# Patient Record
Sex: MALE | Race: WHITE | NOT HISPANIC OR LATINO | Employment: OTHER | URBAN - NONMETROPOLITAN AREA
[De-identification: names, ages, dates, MRNs, and addresses within clinical notes are randomized per-mention and may not be internally consistent; named-entity substitution may affect disease eponyms.]

---

## 2018-02-15 ENCOUNTER — DOCUMENTATION ONLY (OUTPATIENT)
Dept: FAMILY MEDICINE | Facility: OTHER | Age: 48
End: 2018-02-15

## 2018-02-15 PROBLEM — Z98.52 STATUS POST VASECTOMY: Status: ACTIVE | Noted: 2018-02-15

## 2018-02-15 RX ORDER — SULFASALAZINE 500 MG/1
1000 TABLET ORAL 2 TIMES DAILY
COMMUNITY
Start: 2016-09-29 | End: 2018-03-05

## 2018-02-15 RX ORDER — ALBUTEROL SULFATE 90 UG/1
2 AEROSOL, METERED RESPIRATORY (INHALATION) 4 TIMES DAILY PRN
COMMUNITY
Start: 2016-09-29 | End: 2019-02-05

## 2018-03-05 DIAGNOSIS — K51.919 ULCERATIVE COLITIS WITH COMPLICATION, UNSPECIFIED LOCATION (H): Primary | ICD-10-CM

## 2018-03-05 NOTE — TELEPHONE ENCOUNTER
Chart review shows that patient was last seen by PCP on 9/29/16 for diagnosis of ulcerative colitis as well as to discuss continued use of rx as requested. At that time, patient was relocating and PCP recommended annual labwork. No labwork has been completed, and no office visit is noted. Call placed to patient to discuss. Patient reports that he did indeed relocate, but is now back in town for awhile. Is willing to see PCP in the office to discuss labwork that is needed, as well as to discuss his overall health. Writer advised patient that he would hien up rx as requested for a limited supply at this time, and request his PCP's approval. Patient was transferred to scheduling staff for an appointment.    Unable to complete prescription refill per RN Medication Refill Policy. Zhang Maldonado 3/5/2018 4:18 PM

## 2018-03-06 RX ORDER — SULFASALAZINE 500 MG/1
1000 TABLET ORAL 2 TIMES DAILY
Qty: 360 TABLET | Refills: 1 | Status: SHIPPED | OUTPATIENT
Start: 2018-03-06 | End: 2019-02-05

## 2019-02-05 ENCOUNTER — TELEPHONE (OUTPATIENT)
Dept: SURGERY | Facility: OTHER | Age: 49
End: 2019-02-05

## 2019-02-05 ENCOUNTER — OFFICE VISIT (OUTPATIENT)
Dept: FAMILY MEDICINE | Facility: OTHER | Age: 49
End: 2019-02-05
Attending: FAMILY MEDICINE
Payer: COMMERCIAL

## 2019-02-05 VITALS
WEIGHT: 205 LBS | HEIGHT: 71 IN | DIASTOLIC BLOOD PRESSURE: 70 MMHG | SYSTOLIC BLOOD PRESSURE: 104 MMHG | HEART RATE: 78 BPM | BODY MASS INDEX: 28.7 KG/M2 | TEMPERATURE: 97.1 F

## 2019-02-05 DIAGNOSIS — L98.9 SKIN LESION: Primary | ICD-10-CM

## 2019-02-05 DIAGNOSIS — K51.919 ULCERATIVE COLITIS WITH COMPLICATION, UNSPECIFIED LOCATION (H): ICD-10-CM

## 2019-02-05 LAB
ALBUMIN SERPL-MCNC: 4.5 G/DL (ref 3.5–5.7)
ALP SERPL-CCNC: 62 U/L (ref 34–104)
ALT SERPL W P-5'-P-CCNC: 23 U/L (ref 7–52)
ANION GAP SERPL CALCULATED.3IONS-SCNC: 8 MMOL/L (ref 3–14)
AST SERPL W P-5'-P-CCNC: 22 U/L (ref 13–39)
BASOPHILS # BLD AUTO: 0 10E9/L (ref 0–0.2)
BASOPHILS NFR BLD AUTO: 0 %
BILIRUB SERPL-MCNC: 0.5 MG/DL (ref 0.3–1)
BUN SERPL-MCNC: 16 MG/DL (ref 7–25)
CALCIUM SERPL-MCNC: 9.6 MG/DL (ref 8.6–10.3)
CHLORIDE SERPL-SCNC: 104 MMOL/L (ref 98–107)
CO2 SERPL-SCNC: 27 MMOL/L (ref 21–31)
CREAT SERPL-MCNC: 0.97 MG/DL (ref 0.7–1.3)
DIFFERENTIAL METHOD BLD: ABNORMAL
EOSINOPHIL # BLD AUTO: 0 10E9/L (ref 0–0.7)
EOSINOPHIL NFR BLD AUTO: 0.2 %
ERYTHROCYTE [DISTWIDTH] IN BLOOD BY AUTOMATED COUNT: 12.6 % (ref 10–15)
ERYTHROCYTE [SEDIMENTATION RATE] IN BLOOD BY WESTERGREN METHOD: 12 MM/H (ref 1–10)
GFR SERPL CREATININE-BSD FRML MDRD: 83 ML/MIN/{1.73_M2}
GLUCOSE SERPL-MCNC: 93 MG/DL (ref 70–105)
HCT VFR BLD AUTO: 41.7 % (ref 40–53)
HGB BLD-MCNC: 14.4 G/DL (ref 13.3–17.7)
IMM GRANULOCYTES # BLD: 0 10E9/L (ref 0–0.4)
IMM GRANULOCYTES NFR BLD: 0.2 %
LYMPHOCYTES # BLD AUTO: 1.4 10E9/L (ref 0.8–5.3)
LYMPHOCYTES NFR BLD AUTO: 29.8 %
MCH RBC QN AUTO: 33.2 PG (ref 26.5–33)
MCHC RBC AUTO-ENTMCNC: 34.5 G/DL (ref 31.5–36.5)
MCV RBC AUTO: 96 FL (ref 78–100)
MONOCYTES # BLD AUTO: 0.5 10E9/L (ref 0–1.3)
MONOCYTES NFR BLD AUTO: 11.7 %
NEUTROPHILS # BLD AUTO: 2.7 10E9/L (ref 1.6–8.3)
NEUTROPHILS NFR BLD AUTO: 58.1 %
PLATELET # BLD AUTO: 154 10E9/L (ref 150–450)
POTASSIUM SERPL-SCNC: 4.1 MMOL/L (ref 3.5–5.1)
PROT SERPL-MCNC: 7.1 G/DL (ref 6.4–8.9)
RBC # BLD AUTO: 4.34 10E12/L (ref 4.4–5.9)
SODIUM SERPL-SCNC: 139 MMOL/L (ref 134–144)
WBC # BLD AUTO: 4.6 10E9/L (ref 4–11)

## 2019-02-05 PROCEDURE — 99214 OFFICE O/P EST MOD 30 MIN: CPT | Mod: 25 | Performed by: FAMILY MEDICINE

## 2019-02-05 PROCEDURE — 80053 COMPREHEN METABOLIC PANEL: CPT | Performed by: FAMILY MEDICINE

## 2019-02-05 PROCEDURE — 11106 INCAL BX SKN SINGLE LES: CPT | Performed by: FAMILY MEDICINE

## 2019-02-05 PROCEDURE — 85025 COMPLETE CBC W/AUTO DIFF WBC: CPT | Performed by: FAMILY MEDICINE

## 2019-02-05 PROCEDURE — 36415 COLL VENOUS BLD VENIPUNCTURE: CPT | Performed by: FAMILY MEDICINE

## 2019-02-05 PROCEDURE — 88305 TISSUE EXAM BY PATHOLOGIST: CPT

## 2019-02-05 PROCEDURE — 85652 RBC SED RATE AUTOMATED: CPT | Performed by: FAMILY MEDICINE

## 2019-02-05 RX ORDER — SULFASALAZINE 500 MG/1
1000 TABLET ORAL 2 TIMES DAILY
Qty: 360 TABLET | Refills: 3 | Status: SHIPPED | OUTPATIENT
Start: 2019-02-05

## 2019-02-05 ASSESSMENT — MIFFLIN-ST. JEOR: SCORE: 1832

## 2019-02-05 ASSESSMENT — PAIN SCALES - GENERAL: PAINLEVEL: NO PAIN (0)

## 2019-02-05 NOTE — NURSING NOTE
Patient is here for annual physical and medication renewal. Patient also has area on head he would like checked.  Neeta Calloway LPN .............2/5/2019     10:25 AM        Medication Reconciliation: complete    Neeta Calloway LPN  2/5/2019 10:30 AM

## 2019-02-05 NOTE — TELEPHONE ENCOUNTER
Patient referred by Dr. Vieyra for a diagnostic colonoscopy .  Diagnosis is ulcerative colitis with complication.  Last colonoscopy was 09/21/2015.  Please advise.  Thank you. Carmen Howard on 2/5/2019 at 2:03 PM

## 2019-02-05 NOTE — PATIENT INSTRUCTIONS
Continue current medications  Need annual lab draw  Given immunosuppression, skin type (fair), you are at higher risk for skin cancer  May want to consider dermatology consult at some point  Will call with results of biopsy    Need to have colonoscopy 3-5 years, due in 9/2020

## 2019-02-11 ASSESSMENT — ENCOUNTER SYMPTOMS
DIAPHORESIS: 0
ABDOMINAL PAIN: 0
CHOKING: 0
DIARRHEA: 0
FATIGUE: 0
CHILLS: 0
CONSTIPATION: 0
BACK PAIN: 0
DIZZINESS: 0
APPETITE CHANGE: 0
ABDOMINAL DISTENTION: 0
HEMATOCHEZIA: 0
ACTIVITY CHANGE: 0
FEVER: 0
CHEST TIGHTNESS: 0
ANAL BLEEDING: 0

## 2019-03-21 ENCOUNTER — HOSPITAL ENCOUNTER (EMERGENCY)
Facility: OTHER | Age: 49
Discharge: HOME | End: 2019-03-21
Attending: EMERGENCY MEDICINE | Admitting: EMERGENCY MEDICINE
Payer: COMMERCIAL

## 2019-03-21 VITALS
DIASTOLIC BLOOD PRESSURE: 85 MMHG | SYSTOLIC BLOOD PRESSURE: 136 MMHG | BODY MASS INDEX: 28.63 KG/M2 | TEMPERATURE: 98.1 F | WEIGHT: 200 LBS | HEIGHT: 70 IN | OXYGEN SATURATION: 96 % | RESPIRATION RATE: 16 BRPM | HEART RATE: 92 BPM

## 2019-03-21 DIAGNOSIS — L03.116 CELLULITIS OF LEFT LOWER EXTREMITY: ICD-10-CM

## 2019-03-21 PROCEDURE — 25000132 ZZH RX MED GY IP 250 OP 250 PS 637: Performed by: EMERGENCY MEDICINE

## 2019-03-21 PROCEDURE — 99283 EMERGENCY DEPT VISIT LOW MDM: CPT | Mod: Z6 | Performed by: EMERGENCY MEDICINE

## 2019-03-21 PROCEDURE — 99283 EMERGENCY DEPT VISIT LOW MDM: CPT | Performed by: EMERGENCY MEDICINE

## 2019-03-21 RX ORDER — SULFAMETHOXAZOLE/TRIMETHOPRIM 800-160 MG
1 TABLET ORAL 2 TIMES DAILY
Qty: 14 TABLET | Refills: 0 | Status: SHIPPED | OUTPATIENT
Start: 2019-03-21 | End: 2019-03-28

## 2019-03-21 RX ORDER — SULFAMETHOXAZOLE/TRIMETHOPRIM 800-160 MG
1 TABLET ORAL ONCE
Status: COMPLETED | OUTPATIENT
Start: 2019-03-21 | End: 2019-03-21

## 2019-03-21 RX ADMIN — SULFAMETHOXAZOLE AND TRIMETHOPRIM 1 TABLET: 800; 160 TABLET ORAL at 23:18

## 2019-03-21 ASSESSMENT — MIFFLIN-ST. JEOR: SCORE: 1793.44

## 2019-03-21 NOTE — ED AVS SNAPSHOT
[facility]  [address]  [place] [address]  Phone:  [phone]  Fax:  [phone]                                    Lazaro Fernandez   MRN: [id number]    Department:  [facility]   Date of Visit:  3/21/2019           After Visit Summary Signature Page    I have received my discharge instructions, and my questions have been answered. I have discussed any challenges I see with this plan with the nurse or doctor.    ..........................................................................................................................................  Patient/Patient Representative Signature      ..........................................................................................................................................  Patient Representative Print Name and Relationship to Patient    ..................................................               ................................................  Date                                   Time    ..........................................................................................................................................  Reviewed by Signature/Title    ...................................................              ..............................................  Date                                               Time          22EPIC Rev 08/18

## 2019-03-22 NOTE — ED TRIAGE NOTES
Pt here with family with c/o possible infection to left leg, pt states that he noted a sore to lower leg 2 days ago and a fever the past couple of evenings, pt states that tonight he noted redness all the way up his leg, VSS, pt brought back into ER to be evaluated

## 2019-03-22 NOTE — ED PROVIDER NOTES
"Lazaro Fernandez  : 1970 Age: 48 year old Sex: male MRN: [id number]    CC: Leg infection    HPI: Lazaro Fernandez is a 48 year old male with PMH significant for ulcerative colitis on sulfasalazine who presents with an infection of his left leg.  He states that he noted a sore on his left medial calf about 2 days ago and has had subjective fevers with some malaise over the past couple of days.  This evening as he was getting to bed he was not feeling well and then he noticed that the redness surrounding the sore had spread and align up his leg and decided to come in to get evaluated.  He denies any lightheadedness, chest pain, minimal pain to the extremity.    ED Course and MDM:  Leg infection: Overall on exam there is no appreciable abscess, there are no signs or symptoms concerning for necrotizing soft tissue infection.  This is consistent with cellulitis with some lymphatic spread of the leg.  Given the patient's overall appearance and wellness, we discussed outpatient management with antibiotics and he was amenable to this.  He will to be discharged home with prescription for Bactrim and plans for follow-up with primary care    Final Clinical Impression:  Cellulitis    Mike Hanks MD  Internal Medicine and Emergency Medicine  11:11 PM 19      Physical Exam:  /85   Pulse 92   Temp 98.1  F (36.7  C) (Temporal)   Resp 16   Ht 1.778 m (5' 10\")   Wt 90.7 kg (200 lb)   SpO2 96%   BMI 28.70 kg/m      Gen: Awake, alert, no apparent distress  CV: RRR, WWP  Ext: Left lower extremity with 5 x 5 cm area of the erythema with a darker healing sore in the middle with no palpable induration or fluctuance.  Extending proximally up the leg from this there is an erythematous line  that appears to follow the lymphatics.  There is minimal tenderness to palpation of the calf, no crepitus    ROS:  Focused ROS performed and noted in HPI             Mike Hanks MD  19 " [id number]

## 2019-11-06 DIAGNOSIS — K51.919 ULCERATIVE COLITIS WITH COMPLICATION, UNSPECIFIED LOCATION (H): ICD-10-CM

## 2019-11-11 RX ORDER — SULFASALAZINE 500 MG/1
1000 TABLET ORAL 2 TIMES DAILY
Qty: 360 TABLET | Refills: 3 | OUTPATIENT
Start: 2019-11-11

## 2019-11-11 NOTE — TELEPHONE ENCOUNTER
Redundant refill request refused: Too soon:    sulfaSALAzine (AZULFIDINE) 500 MG tablet 360 tablet 3 2/5/2019  No   Sig - Route: Take 2 tablets (1,000 mg) by mouth 2 times daily - Oral   Patient taking differently: Take 500 mg by mouth 2 times daily         Sent to pharmacy as: sulfaSALAzine (AZULFIDINE) 500 MG tablet   Class: E-Prescribe   Order: [id number]   E-Prescribing Status: Receipt confirmed by pharmacy (2/5/2019 10:44 AM CST)   Printout Tracking     External Result Report   Pharmacy     [id number] - [place], [place] - [address]     Unable to complete prescription refill per RN Medication Refill Policy. Elis Liz RN .............. 11/11/2019  8:58 AM

## 2019-11-22 ENCOUNTER — MYC REFILL (OUTPATIENT)
Dept: FAMILY MEDICINE | Facility: OTHER | Age: 49
End: 2019-11-22

## 2019-11-22 DIAGNOSIS — K51.919 ULCERATIVE COLITIS WITH COMPLICATION, UNSPECIFIED LOCATION (H): ICD-10-CM

## 2019-11-26 RX ORDER — SULFASALAZINE 500 MG/1
1000 TABLET ORAL 2 TIMES DAILY
Qty: 360 TABLET | Refills: 3 | OUTPATIENT
Start: 2019-11-26

## 2019-11-26 NOTE — TELEPHONE ENCOUNTER
Disp Refills Start End JESSICA   sulfaSALAzine (AZULFIDINE) 500 MG tablet 360 tablet 3 2/5/2019  No   Sig - Route: Take 2 tablets (1,000 mg) by mouth 2 times daily - Oral   Patient taking differently: Take 500 mg by mouth 2 times daily             LOV: 2/5/2019    Called [facility]. Script was transferred to [facility]. I called [facility] they have script on hand. I then called patient and let him know and asked if he wanted it at [facility] or wanted it transferred to [facility]. Patient reported no that's fine he will just picked it up at one of the [facility] in [place].  Denying refill request at this time. Clarita Martinez RN  ....................  11/26/2019   2:04 PM      Requested Prescriptions   Pending Prescriptions Disp Refills     sulfaSALAzine (AZULFIDINE) 500 MG tablet 360 tablet 3     Sig: Take 2 tablets (1,000 mg) by mouth 2 times daily       There is no refill protocol information for this order

## 2019-12-12 ENCOUNTER — OFFICE VISIT (OUTPATIENT)
Dept: FAMILY MEDICINE | Facility: OTHER | Age: 49
End: 2019-12-12
Attending: FAMILY MEDICINE
Payer: COMMERCIAL

## 2019-12-12 ENCOUNTER — MYC MEDICAL ADVICE (OUTPATIENT)
Dept: FAMILY MEDICINE | Facility: OTHER | Age: 49
End: 2019-12-12

## 2019-12-12 VITALS
WEIGHT: 199.25 LBS | DIASTOLIC BLOOD PRESSURE: 72 MMHG | BODY MASS INDEX: 27.9 KG/M2 | HEART RATE: 80 BPM | HEIGHT: 71 IN | SYSTOLIC BLOOD PRESSURE: 116 MMHG | TEMPERATURE: 96.7 F | RESPIRATION RATE: 18 BRPM

## 2019-12-12 DIAGNOSIS — K51.811 OTHER ULCERATIVE COLITIS WITH RECTAL BLEEDING (H): Primary | ICD-10-CM

## 2019-12-12 DIAGNOSIS — Z12.83 SKIN EXAM FOR MALIGNANT NEOPLASM: ICD-10-CM

## 2019-12-12 DIAGNOSIS — L57.0 AK (ACTINIC KERATOSIS): ICD-10-CM

## 2019-12-12 LAB
BASOPHILS # BLD AUTO: 0 10E9/L (ref 0–0.2)
BASOPHILS NFR BLD AUTO: 0 %
DIFFERENTIAL METHOD BLD: ABNORMAL
EOSINOPHIL # BLD AUTO: 0 10E9/L (ref 0–0.7)
EOSINOPHIL NFR BLD AUTO: 0 %
ERYTHROCYTE [DISTWIDTH] IN BLOOD BY AUTOMATED COUNT: 13.6 % (ref 10–15)
ERYTHROCYTE [SEDIMENTATION RATE] IN BLOOD BY WESTERGREN METHOD: 22 MM/H (ref 1–10)
HCT VFR BLD AUTO: 40.3 % (ref 40–53)
HGB BLD-MCNC: 12.9 G/DL (ref 13.3–17.7)
IMM GRANULOCYTES # BLD: 0 10E9/L (ref 0–0.4)
IMM GRANULOCYTES NFR BLD: 0.2 %
LYMPHOCYTES # BLD AUTO: 1.4 10E9/L (ref 0.8–5.3)
LYMPHOCYTES NFR BLD AUTO: 28.6 %
MCH RBC QN AUTO: 30.9 PG (ref 26.5–33)
MCHC RBC AUTO-ENTMCNC: 32 G/DL (ref 31.5–36.5)
MCV RBC AUTO: 96 FL (ref 78–100)
MONOCYTES # BLD AUTO: 0.7 10E9/L (ref 0–1.3)
MONOCYTES NFR BLD AUTO: 14.1 %
NEUTROPHILS # BLD AUTO: 2.8 10E9/L (ref 1.6–8.3)
NEUTROPHILS NFR BLD AUTO: 57.1 %
PLATELET # BLD AUTO: 171 10E9/L (ref 150–450)
RBC # BLD AUTO: 4.18 10E12/L (ref 4.4–5.9)
WBC # BLD AUTO: 4.9 10E9/L (ref 4–11)

## 2019-12-12 PROCEDURE — 36415 COLL VENOUS BLD VENIPUNCTURE: CPT | Mod: ZL | Performed by: FAMILY MEDICINE

## 2019-12-12 PROCEDURE — 85025 COMPLETE CBC W/AUTO DIFF WBC: CPT | Mod: ZL | Performed by: FAMILY MEDICINE

## 2019-12-12 PROCEDURE — 99214 OFFICE O/P EST MOD 30 MIN: CPT | Performed by: FAMILY MEDICINE

## 2019-12-12 PROCEDURE — 85652 RBC SED RATE AUTOMATED: CPT | Mod: ZL | Performed by: FAMILY MEDICINE

## 2019-12-12 RX ORDER — BUDESONIDE 9 MG/1
9 TABLET, FILM COATED, EXTENDED RELEASE ORAL EVERY MORNING
Qty: 56 TABLET | Refills: 0 | Status: SHIPPED | OUTPATIENT
Start: 2019-12-12 | End: 2019-12-17

## 2019-12-12 ASSESSMENT — ENCOUNTER SYMPTOMS
CONSTIPATION: 0
ANAL BLEEDING: 1
FEVER: 0
FATIGUE: 0
DIARRHEA: 0
HEARTBURN: 0
ABDOMINAL PAIN: 0
RECTAL PAIN: 0
NAUSEA: 0
UNEXPECTED WEIGHT CHANGE: 0
VOMITING: 0
HEMATOCHEZIA: 1
HEADACHES: 0

## 2019-12-12 ASSESSMENT — MIFFLIN-ST. JEOR: SCORE: 1800.92

## 2019-12-12 ASSESSMENT — PAIN SCALES - GENERAL: PAINLEVEL: NO PAIN (0)

## 2019-12-12 NOTE — PROGRESS NOTES
SUBJECTIVE:   Lazaro Fernandez is a 49 year old male who presents to clinic today for the following health issues:  Nursing Notes:   Elis Kendall LPN  12/12/2019 11:29 AM  Signed  Patient presents to clinic for follow up with ulcerative colitis.  Also, he is experiencing repeat derm bumps along hairline of head.    Medication Reconciliation: complete    Elis Kendall LPN      HPI     Pleasant 49-year-old male with ulcerative colitis presents with bright red blood in his stool.  Symptoms have been present for the past 3 months.  Reports bloody discharge at the end of his stool.  Ulcerative colitis was diagnosed in 2005 at [place].  He was followed by gastroenterology for many years with his last colonoscopy being in 2015.  He had very mild disease and was told he can go to colonoscopies every 5 years.  Last UC flare was over 10 years ago.    He denies change in weight or appetite.    He decided to double sulfsalazine with no change in symptoms    He admits to being under fair amount of stress.  His father was diagnosed with leukemia and has been in the hospital last few months.  They have been traveling to [place] to visit him.        Patient Active Problem List    Diagnosis Date Noted     Status post vasectomy 02/15/2018     Priority: Medium     Idiopathic urticaria 06/29/2012     Priority: Medium     Ganglion of joint 12/02/2011     Priority: Medium     Ulcerative colitis (H) 04/01/2002     Priority: Medium     Past Medical History:   Diagnosis Date     Ulcerative colitis without complications (H)     mild. Last colonoscopy 2008. Dr Szymanski ([facility])      Current Outpatient Medications   Medication Sig Dispense Refill     budesonide (UCERIS) 9 MG 24 hr tablet Take 1 tablet (9 mg) by mouth every morning 56 tablet 0     sulfaSALAzine (AZULFIDINE) 500 MG tablet Take 2 tablets (1,000 mg) by mouth 2 times daily (Patient taking differently: Take 500 mg by mouth 2 times daily ) 360 tablet 3  "      Review of Systems   Constitutional: Negative for fatigue, fever and unexpected weight change.   Gastrointestinal: Positive for anal bleeding and hematochezia. Negative for abdominal pain, constipation, diarrhea, heartburn, nausea, rectal pain and vomiting.   Skin: Negative for rash.   Allergic/Immunologic: Negative for food allergies.   Neurological: Negative for headaches.        OBJECTIVE:     /72 (BP Location: Right arm, Patient Position: Sitting, Cuff Size: Adult Large)   Pulse 80   Temp 96.7  F (35.9  C) (Tympanic)   Resp 18   Ht 1.803 m (5' 11\")   Wt 90.4 kg (199 lb 4 oz)   BMI 27.79 kg/m    Body mass index is 27.79 kg/m .  Physical Exam  HENT:      Head: Normocephalic.   Cardiovascular:      Rate and Rhythm: Normal rate.      Heart sounds: No murmur.   Pulmonary:      Effort: Pulmonary effort is normal. No respiratory distress.      Breath sounds: No wheezing.   Abdominal:      General: Abdomen is flat. Bowel sounds are normal. There is no distension.      Palpations: Abdomen is soft. There is no mass.      Tenderness: There is no abdominal tenderness. There is no guarding.      Hernia: No hernia is present.   Skin:     Coloration: Skin is not jaundiced.      Findings: No rash.   Neurological:      Mental Status: He is alert.   Psychiatric:         Mood and Affect: Mood normal.         Diagnostic Test Results:  Results for orders placed or performed in visit on 12/12/19 (from the past 24 hour(s))   Sedimentation Rate (ESR)   Result Value Ref Range    Sed Rate 22 (H) 1 - 10 mm/h   CBC with platelets differential   Result Value Ref Range    WBC 4.9 4.0 - 11.0 10e9/L    RBC Count 4.18 (L) 4.4 - 5.9 10e12/L    Hemoglobin 12.9 (L) 13.3 - 17.7 g/dL    Hematocrit 40.3 40.0 - 53.0 %    MCV 96 78 - 100 fl    MCH 30.9 26.5 - 33.0 pg    MCHC 32.0 31.5 - 36.5 g/dL    RDW 13.6 10.0 - 15.0 %    Platelet Count 171 150 - 450 10e9/L    Diff Method Automated Method     % Neutrophils 57.1 %    % Lymphocytes " 28.6 %    % Monocytes 14.1 %    % Eosinophils 0.0 %    % Basophils 0.0 %    % Immature Granulocytes 0.2 %    Absolute Neutrophil 2.8 1.6 - 8.3 10e9/L    Absolute Lymphocytes 1.4 0.8 - 5.3 10e9/L    Absolute Monocytes 0.7 0.0 - 1.3 10e9/L    Absolute Eosinophils 0.0 0.0 - 0.7 10e9/L    Absolute Basophils 0.0 0.0 - 0.2 10e9/L    Abs Immature Granulocytes 0.0 0 - 0.4 10e9/L       ASSESSMENT/PLAN:           ICD-10-CM    1. Other ulcerative colitis with rectal bleeding (H) K51.811 CBC with platelets differential     Sedimentation Rate (ESR)     GASTROENTEROLOGY ADULT REF PROCEDURE ONLY [facility] [phone]     Sedimentation Rate (ESR)     CBC with platelets differential     budesonide (UCERIS) 9 MG 24 hr tablet   2. Skin exam for malignant neoplasm Z12.83 DERMATOLOGY REFERRAL   3. AK (actinic keratosis) L57.0 DERMATOLOGY REFERRAL       Hemoglobin is down to 12.9 with slight elevation in sed rate consistent with colitis flare.  Will treat with budesonide 9 mg daily for 8 weeks.  Discussed with Dr. Matias and will get him set up in the next few weeks for diagnostic colonoscopy as he would be due in early 2020.     referred to dermatology for skin exam  Patient Instructions   Labs today  Schedule c-scope in the next few weeks  May consider prednisone burst if sed rate is high  Will call later today with lab results    I spent over 25 minutes with the patient, greater than 50% was spent in counseling and coordination of care.  [facility]

## 2019-12-12 NOTE — PATIENT INSTRUCTIONS
Labs today  Schedule c-scope in the next few weeks  May consider prednisone burst if sed rate is high  Will call later today with lab results

## 2019-12-12 NOTE — NURSING NOTE
Patient presents to clinic for follow up with ulcerative colitis.  Also, he is experiencing repeat derm bumps along hairline of head.    Medication Reconciliation: complete    Elis Kendall LPN

## 2019-12-13 ENCOUNTER — TELEPHONE (OUTPATIENT)
Dept: SURGERY | Facility: OTHER | Age: 49
End: 2019-12-13

## 2019-12-13 ENCOUNTER — MYC MEDICAL ADVICE (OUTPATIENT)
Dept: FAMILY MEDICINE | Facility: OTHER | Age: 49
End: 2019-12-13

## 2019-12-13 DIAGNOSIS — K51.811 OTHER ULCERATIVE COLITIS WITH RECTAL BLEEDING (H): Primary | ICD-10-CM

## 2019-12-13 NOTE — TELEPHONE ENCOUNTER
Patient referred by Dr. Vieyra for a diagnostic colonoscopy ,   Diagnosis is ulcerative colitis with rectal bleeding .  Please advise if ok to schedule .  Thank you. Carmen Howard on 12/13/2019 at 9:05 AM

## 2019-12-17 RX ORDER — BUDESONIDE 3 MG/1
9 CAPSULE, COATED PELLETS ORAL EVERY MORNING
Qty: 90 CAPSULE | Refills: 1 | Status: SHIPPED | OUTPATIENT
Start: 2019-12-17

## 2019-12-17 NOTE — TELEPHONE ENCOUNTER
"1. Will forward this message to the AFR who sent the dermatology referral to [facility], as it is apparently well known that [facility] does not accept [facility]. The referral also states \"or [facility]\". Please forward this information to [facility].    2. Medication: Budesonide 9 mg 24 hour tablet is not covered by [employer]. [facility] states that the PA paperwork was faxed to our [facility] on 12/13/19.   Would you like to try sending an alternative first?  Lillian Fitzpatrick CMA([facility])..................12/17/2019   8:35 AM   "

## 2019-12-18 DIAGNOSIS — K51.919 ULCERATIVE COLITIS WITH COMPLICATION, UNSPECIFIED LOCATION (H): Primary | ICD-10-CM

## 2019-12-18 NOTE — TELEPHONE ENCOUNTER
Screening Questions for the Scheduling of Screening Colonoscopies   (If Colonoscopy is diagnostic, Provider should review the chart before scheduling.)  Are you younger than 50 or older than 80?  YES   Do you take aspirin or fish oil?  NO  (if yes, tell patient to stop 1 week prior to Colonoscopy)  Do you take warfarin (Coumadin), clopidogrel (Plavix), apixaban (Eliquis), dabigatram (Pradaxa), rivaroxaban (Xarelto) or any blood thinner? NO   Do you use oxygen at home?  NO   Do you have kidney disease? NO   Are you on dialysis? NO   Have you had a stroke or heart attack in the last year? NO   Have you had a stent in your heart or any blood vessel in the last year? NO   Have you had a transplant of any organ? NO   Have you had a colonoscopy or upper endoscopy (EGD) before? YES          When?  5 YRS AGO   Date of scheduled Colonoscopy. 01/15/2020  Provider [facility]   Pharmacy [facility]

## 2019-12-30 RX ORDER — BISACODYL 5 MG/1
TABLET, DELAYED RELEASE ORAL
Qty: 2 TABLET | Refills: 0 | Status: ON HOLD | OUTPATIENT
Start: 2019-12-30 | End: 2020-01-15

## 2019-12-30 RX ORDER — POLYETHYLENE GLYCOL 3350, SODIUM CHLORIDE, SODIUM BICARBONATE, POTASSIUM CHLORIDE 420; 11.2; 5.72; 1.48 G/4L; G/4L; G/4L; G/4L
4000 POWDER, FOR SOLUTION ORAL ONCE
Qty: 4000 ML | Refills: 0 | Status: ON HOLD | OUTPATIENT
Start: 2019-12-30 | End: 2020-01-15

## 2020-01-15 ENCOUNTER — ANESTHESIA EVENT (OUTPATIENT)
Dept: SURGERY | Facility: OTHER | Age: 50
End: 2020-01-15
Payer: COMMERCIAL

## 2020-01-15 ENCOUNTER — HOSPITAL ENCOUNTER (OUTPATIENT)
Facility: OTHER | Age: 50
Discharge: HOME | End: 2020-01-15
Attending: SURGERY | Admitting: SURGERY
Payer: COMMERCIAL

## 2020-01-15 ENCOUNTER — ANESTHESIA (OUTPATIENT)
Dept: SURGERY | Facility: OTHER | Age: 50
End: 2020-01-15
Payer: COMMERCIAL

## 2020-01-15 VITALS
WEIGHT: 199 LBS | HEART RATE: 62 BPM | TEMPERATURE: 96.5 F | BODY MASS INDEX: 27.75 KG/M2 | OXYGEN SATURATION: 97 % | DIASTOLIC BLOOD PRESSURE: 90 MMHG | SYSTOLIC BLOOD PRESSURE: 129 MMHG | RESPIRATION RATE: 16 BRPM

## 2020-01-15 DIAGNOSIS — K51.811 OTHER ULCERATIVE COLITIS WITH RECTAL BLEEDING (H): Primary | ICD-10-CM

## 2020-01-15 PROCEDURE — 88305 TISSUE EXAM BY PATHOLOGIST: CPT

## 2020-01-15 PROCEDURE — 25000125 ZZHC RX 250: Performed by: NURSE ANESTHETIST, CERTIFIED REGISTERED

## 2020-01-15 PROCEDURE — 25800030 ZZH RX IP 258 OP 636: Performed by: SURGERY

## 2020-01-15 PROCEDURE — 45380 COLONOSCOPY AND BIOPSY: CPT | Performed by: NURSE ANESTHETIST, CERTIFIED REGISTERED

## 2020-01-15 PROCEDURE — 45380 COLONOSCOPY AND BIOPSY: CPT | Performed by: SURGERY

## 2020-01-15 PROCEDURE — 25000125 ZZHC RX 250: Performed by: SURGERY

## 2020-01-15 PROCEDURE — 88342 IMHCHEM/IMCYTCHM 1ST ANTB: CPT

## 2020-01-15 PROCEDURE — 25000128 H RX IP 250 OP 636: Performed by: NURSE ANESTHETIST, CERTIFIED REGISTERED

## 2020-01-15 PROCEDURE — 25000132 ZZH RX MED GY IP 250 OP 250 PS 637: Performed by: SURGERY

## 2020-01-15 RX ORDER — NALOXONE HYDROCHLORIDE 0.4 MG/ML
.1-.4 INJECTION, SOLUTION INTRAMUSCULAR; INTRAVENOUS; SUBCUTANEOUS
Status: DISCONTINUED | OUTPATIENT
Start: 2020-01-15 | End: 2020-01-15 | Stop reason: HOSPADM

## 2020-01-15 RX ORDER — FLUMAZENIL 0.1 MG/ML
0.2 INJECTION, SOLUTION INTRAVENOUS
Status: DISCONTINUED | OUTPATIENT
Start: 2020-01-15 | End: 2020-01-15 | Stop reason: HOSPADM

## 2020-01-15 RX ORDER — PROPOFOL 10 MG/ML
INJECTION, EMULSION INTRAVENOUS CONTINUOUS PRN
Status: DISCONTINUED | OUTPATIENT
Start: 2020-01-15 | End: 2020-01-15

## 2020-01-15 RX ORDER — SODIUM CHLORIDE, SODIUM LACTATE, POTASSIUM CHLORIDE, CALCIUM CHLORIDE 600; 310; 30; 20 MG/100ML; MG/100ML; MG/100ML; MG/100ML
INJECTION, SOLUTION INTRAVENOUS CONTINUOUS
Status: DISCONTINUED | OUTPATIENT
Start: 2020-01-15 | End: 2020-01-15 | Stop reason: HOSPADM

## 2020-01-15 RX ORDER — LIDOCAINE HYDROCHLORIDE 20 MG/ML
INJECTION, SOLUTION INFILTRATION; PERINEURAL PRN
Status: DISCONTINUED | OUTPATIENT
Start: 2020-01-15 | End: 2020-01-15

## 2020-01-15 RX ORDER — PROPOFOL 10 MG/ML
INJECTION, EMULSION INTRAVENOUS PRN
Status: DISCONTINUED | OUTPATIENT
Start: 2020-01-15 | End: 2020-01-15

## 2020-01-15 RX ORDER — SIMETHICONE
LIQUID (ML) MISCELLANEOUS PRN
Status: DISCONTINUED | OUTPATIENT
Start: 2020-01-15 | End: 2020-01-15 | Stop reason: HOSPADM

## 2020-01-15 RX ORDER — LIDOCAINE 40 MG/G
CREAM TOPICAL
Status: DISCONTINUED | OUTPATIENT
Start: 2020-01-15 | End: 2020-01-15 | Stop reason: HOSPADM

## 2020-01-15 RX ADMIN — PROPOFOL 120 MG: 10 INJECTION, EMULSION INTRAVENOUS at 12:41

## 2020-01-15 RX ADMIN — LIDOCAINE HYDROCHLORIDE 40 MG: 20 INJECTION, SOLUTION INFILTRATION; PERINEURAL at 12:41

## 2020-01-15 RX ADMIN — PROPOFOL 140 MCG/KG/MIN: 10 INJECTION, EMULSION INTRAVENOUS at 12:41

## 2020-01-15 RX ADMIN — SODIUM CHLORIDE, POTASSIUM CHLORIDE, SODIUM LACTATE AND CALCIUM CHLORIDE: 600; 310; 30; 20 INJECTION, SOLUTION INTRAVENOUS at 11:26

## 2020-01-15 NOTE — ANESTHESIA CARE TRANSFER NOTE
Patient: Lazaro Fernandez    Procedure(s):  COLONOSCOPY, WITH POLYPECTOMY AND BIOPSY    Diagnosis: Colitis [K52.9]  Rectal bleeding [K62.5]  Diagnosis Additional Information: No value filed.    Anesthesia Type:   MAC     Note:  Airway :Nasal Cannula  Patient transferred to:Phase II  Handoff Report: Identifed the Patient, Identified the Reponsible Provider, Reviewed the pertinent medical history, Discussed the surgical course, Reviewed Intra-OP anesthesia mangement and issues during anesthesia, Set expectations for post-procedure period and Allowed opportunity for questions and acknowledgement of understanding      Vitals: (Last set prior to Anesthesia Care Transfer)    CRNA VITALS  1/15/2020 1240 - 1/15/2020 1313      1/15/2020             NIBP:  97/80    Pulse:  82    NIBP Mean:  85    Ht Rate:  82    SpO2:  96 %    Resp Rate (set):  10                Electronically Signed By: THU Brito CRNA  January 15, 2020  1:13 PM

## 2020-01-15 NOTE — ANESTHESIA POSTPROCEDURE EVALUATION
Patient: Lazaro Fernandez    Procedure(s):  COLONOSCOPY, WITH POLYPECTOMY AND BIOPSY    Diagnosis:Colitis [K52.9]  Rectal bleeding [K62.5]  Diagnosis Additional Information: No value filed.    Anesthesia Type:  MAC    Note:  Anesthesia Post Evaluation    Patient location during evaluation: Phase 2 and Endoscopy Recovery  Patient participation: Able to fully participate in evaluation  Level of consciousness: awake and alert  Pain management: adequate  Airway patency: patent  Cardiovascular status: acceptable  Respiratory status: acceptable  Hydration status: acceptable  PONV: none     Anesthetic complications: None          Last vitals:  Vitals:    01/15/20 1116 01/15/20 1312   BP: 130/88    Pulse: 68    Resp: 16 16   Temp: 96.7  F (35.9  C) 96.5  F (35.8  C)   SpO2: 96% 94%         Electronically Signed By: THU Brito CRNA  January 15, 2020  1:34 PM

## 2020-01-15 NOTE — H&P
GENERAL SURGERY CONSULTATION NOTE    Lazaro Fernandez   [address]  [facility] [place] [address]  49 year old  male    Primary Care Provider:  Grazyna Duffy      HPI: Lazaro Fernandez presents to day surgery in need of colonscopy for history of ulcerative colitis and rectal bleeding. Recently increased dose of sulfasalazine and started steroids for colitis.   Lazaro eFrnandez denies family history of colon cancer. Previous colonoscopy was 2015.     REVIEW OF SYSTEMS:    GENERAL: No fevers or chills. Denies fatigue, recent weight loss.  HEENT: No sinus drainage. No changes with vision or hearing. No difficulty swallowing.   LYMPHATICS:  Noswollen nodes in axilla, neck or groin.  CARDIOVASCULAR: Denies chest pain, palpitations and dyspnea on exertion.  PULMONARY: No shortness of breath or cough. No increase in sputum production.  GI: Denies melena,bright red blood in stools. No hematemesis. No constipation or diarrhea.  : No dysuria or hematuria.  SKIN: No recent rashes or ulcers.   HEMATOLOGY:  No history of easy bruising or bleeding.  ENDOCRINE:  No history of diabetes or thyroid problems.  NEUROLOGY:  No history of seizures or headaches. No motor or sensory changes.        Patient Active Problem List   Diagnosis     Ganglion of joint     Idiopathic urticaria     Status post vasectomy     Ulcerative colitis (H)       Past Medical History:   Diagnosis Date     Ulcerative colitis without complications (H)     mild. Last colonoscopy 2008. Dr Szymanski ([facility])       Past Surgical History:   Procedure Laterality Date     COLONOSCOPY      04/12/05,Dr. Farhan Szymanski, [facility].  Advised recheck in 3 years. Advised recheck in 3 years.     COLONOSCOPY  09/21/2015    9/21/15,F/U 2020 colitis     OTHER SURGICAL HISTORY      1984,[id number],OTHER     OTHER SURGICAL HISTORY      1989,[id number],OTHER     OTHER SURGICAL HISTORY      10/24/2008,[id number],OTHER,Dr. Szymanski, [facility].       Family History    Problem Relation Age of Onset     Heart Disease Father         Heart Disease     Other - See Comments Father         Elevated Cholesterol     Other - See Comments Mother         No major medical problems.     Other - See Comments Brother          MVA     Family History Negative Brother         Good Health     Family History Negative Sister         Good Health       Social History     Social History Narrative    . No kids. [employer].       Social History     Socioeconomic History     Marital status:      Spouse name: Not on file     Number of children: Not on file     Years of education: Not on file     Highest education level: Not on file   Occupational History     Not on file   Social Needs     Financial resource strain: Not on file     Food insecurity:     Worry: Not on file     Inability: Not on file     Transportation needs:     Medical: Not on file     Non-medical: Not on file   Tobacco Use     Smoking status: Never Smoker     Smokeless tobacco: Never Used   Substance and Sexual Activity     Alcohol use: Yes     Comment: Alcoholic Drinks/day: socially, beer     Drug use: No     Comment: Drug use: No     Sexual activity: Yes     Partners: Female   Lifestyle     Physical activity:     Days per week: Not on file     Minutes per session: Not on file     Stress: Not on file   Relationships     Social connections:     Talks on phone: Not on file     Gets together: Not on file     Attends Latter day service: Not on file     Active member of club or organization: Not on file     Attends meetings of clubs or organizations: Not on file     Relationship status: Not on file     Intimate partner violence:     Fear of current or ex partner: Not on file     Emotionally abused: Not on file     Physically abused: Not on file     Forced sexual activity: Not on file   Other Topics Concern     Not on file   Social History Narrative    . No kids. [employer].       No current facility-administered  medications on file prior to encounter.   budesonide (ENTOCORT EC) 3 MG EC capsule, Take 3 capsules (9 mg) by mouth every morning  sulfaSALAzine (AZULFIDINE) 500 MG tablet, Take 2 tablets (1,000 mg) by mouth 2 times daily (Patient taking differently: Take 500 mg by mouth daily )          ALLERGIES/SENSITIVITIES:   Allergies   Allergen Reactions     Amoxicillin-Pot Clavulanate Nausea and Vomiting       PHYSICAL EXAM:     /88   Pulse 68   Temp 96.7  F (35.9  C) (Tympanic)   Resp 16   Wt 90.3 kg (199 lb)   SpO2 96%   BMI 27.75 kg/m      General Appearance:   Sitting up in bed, no apparent distress  HEENT: Pupils are equal and reactive, no scleral icterus   Heart & CV:  RRR, no murmur.  LUNGS: No increased work of breathing. Lungs are CTA B/L, no wheezing or crackles.  Abd:  soft, non-tender, no masses   Ext: no lower extremity edema   Neuro: alert and oriented, normal speech and mentation         CONSULTATION ASSESSMENT AND PLAN:    49 year old male with ulcerative colitis and rectal bleeding.      The technical details of colonoscopy were discussed with the patient along with the risks and benefits to include bleeding, perforation and incomplete study. Lazaro Fernandez demonstrated understanding and is willing to proceed.       Sean Chavez MD on 1/15/2020 at 12:34 PM

## 2020-01-15 NOTE — DISCHARGE INSTRUCTIONS
[facility]  Adult Discharge Orders & Instructions    ________________________________________________________________          For 12 hours after surgery  1. Get plenty of rest.  A responsible adult must stay with you for at least 12 hours after you leave the hospital.   2. You may feel lightheaded.  IF so, sit for a few minutes before standing.  Have someone help you get up.   3. You may have a slight fever. Call the doctor if your fever is over 101 F (38.3 C) (taken under the tongue) or lasts longer than 24 hours.  4. You may have a dry mouth, a sore throat, muscle aches or trouble sleeping.  These should go away after 24 hours.  5. Do not make important or legal decisions.  6.   Do not drive or use heavy equipment.  If you have weakness or tingling, don't drive or use heavy equipment until this feeling goes away.    To contact a doctor, call   [phone]_______________________

## 2020-01-15 NOTE — ANESTHESIA PREPROCEDURE EVALUATION
Anesthesia Pre-Procedure Evaluation    Patient: Lazaro Fernandez   MRN: [id number] : 1970          Preoperative Diagnosis: Colitis [K52.9]  Rectal bleeding [K62.5]    Procedure(s):  COLONOSCOPY    Past Medical History:   Diagnosis Date     Ulcerative colitis without complications (H)     mild. Last colonoscopy . Dr Szymanski ([facility])     Past Surgical History:   Procedure Laterality Date     COLONOSCOPY      05,Dr. Farhan Szymanski, , [place].  Advised recheck in 3 years. Advised recheck in 3 years.     COLONOSCOPY  2015    9/21/15,F/U  colitis     OTHER SURGICAL HISTORY      ,[id number],OTHER     OTHER SURGICAL HISTORY      ,[id number],OTHER     OTHER SURGICAL HISTORY      10/24/2008,[id number],OTHER,Dr. Szymanski, [facility] [address] [facility].       Anesthesia Evaluation     . Pt has had prior anesthetic.     No history of anesthetic complications          ROS/MED HX    ENT/Pulmonary:  - neg pulmonary ROS     Neurologic:  - neg neurologic ROS     Cardiovascular:  - neg cardiovascular ROS       METS/Exercise Tolerance:  >4 METS   Hematologic:  - neg hematologic  ROS       Musculoskeletal:  - neg musculoskeletal ROS       GI/Hepatic:  - neg GI/hepatic ROS       Renal/Genitourinary:  - ROS Renal section negative       Endo:  - neg endo ROS       Psychiatric:  - neg psychiatric ROS       Infectious Disease:  - neg infectious disease ROS       Malignancy:      - no malignancy   Other:    - neg other ROS                      Physical Exam  Normal systems: cardiovascular, pulmonary and dental    Airway   Mallampati: I  TM distance: >3 FB  Neck ROM: full    Dental     Cardiovascular   Rhythm and rate: regular and normal      Pulmonary    breath sounds clear to auscultation            Lab Results   Component Value Date    WBC 4.9 2019    HGB 12.9 (L) 2019    HCT 40.3 2019     2019    SED 22 (H) 2019     2019    POTASSIUM 4.1 2019    CHLORIDE 104 2019     "CO2 27 02/05/2019    BUN 16 02/05/2019    CR 0.97 02/05/2019    GLC 93 02/05/2019    ROBYN 9.6 02/05/2019    ALBUMIN 4.5 02/05/2019    PROTTOTAL 7.1 02/05/2019    ALT 23 02/05/2019    AST 22 02/05/2019    ALKPHOS 62 02/05/2019    BILITOTAL 0.5 02/05/2019       Preop Vitals  BP Readings from Last 3 Encounters:   01/15/20 130/88   12/12/19 116/72   03/21/19 136/85    Pulse Readings from Last 3 Encounters:   01/15/20 68   12/12/19 80   03/21/19 92      Resp Readings from Last 3 Encounters:   01/15/20 16   12/12/19 18   03/21/19 16    SpO2 Readings from Last 3 Encounters:   01/15/20 96%   03/21/19 96%      Temp Readings from Last 1 Encounters:   01/15/20 96.7  F (35.9  C) (Tympanic)    Ht Readings from Last 1 Encounters:   12/12/19 1.803 m (5' 11\")      Wt Readings from Last 1 Encounters:   01/15/20 90.3 kg (199 lb)    Estimated body mass index is 27.75 kg/m  as calculated from the following:    Height as of 12/12/19: 1.803 m (5' 11\").    Weight as of this encounter: 90.3 kg (199 lb).       Anesthesia Plan      History & Physical Review      ASA Status:  1 .    NPO Status:  > 6 hours    Plan for MAC with Intravenous induction.          Postoperative Care      Consents  Anesthetic plan, risks, benefits and alternatives discussed with:  Patient..                 THU SELLERS CRNA  "

## 2020-02-26 ENCOUNTER — TRANSFERRED RECORDS (OUTPATIENT)
Dept: HEALTH INFORMATION MANAGEMENT | Facility: OTHER | Age: 50
End: 2020-02-26

## 2020-03-11 ENCOUNTER — HEALTH MAINTENANCE LETTER (OUTPATIENT)
Age: 50
End: 2020-03-11

## 2020-12-27 ENCOUNTER — HEALTH MAINTENANCE LETTER (OUTPATIENT)
Age: 50
End: 2020-12-27

## 2021-04-25 ENCOUNTER — HEALTH MAINTENANCE LETTER (OUTPATIENT)
Age: 51
End: 2021-04-25

## 2021-10-09 ENCOUNTER — HEALTH MAINTENANCE LETTER (OUTPATIENT)
Age: 51
End: 2021-10-09

## 2022-05-21 ENCOUNTER — HEALTH MAINTENANCE LETTER (OUTPATIENT)
Age: 52
End: 2022-05-21

## 2022-09-17 ENCOUNTER — HEALTH MAINTENANCE LETTER (OUTPATIENT)
Age: 52
End: 2022-09-17

## 2023-06-04 ENCOUNTER — HEALTH MAINTENANCE LETTER (OUTPATIENT)
Age: 53
End: 2023-06-04

## 2024-05-10 NOTE — PROGRESS NOTES
SUBJECTIVE:   Lazaro Fernandez is a 48 year old male who presents to clinic today for the following health issues:  Nursing Notes:   Neeta Calloway LPN  2/5/2019 10:37 AM  Signed  Patient is here for annual physical and medication renewal. Patient also has area on head he would like checked.  Neeta Calloway LPN .............2/5/2019     10:25 AM        Medication Reconciliation: complete    Neeta Calloway LPN  2/5/2019 10:30 AM    HPI    47 yo male presents for annual review. He has been feeling well, only rare UC flares, self-limted with bland diet. Last c-scope was in 2015, told q5 years.  He has a few skin lesion on temples, previously treated with liquid nitrogen, returned  No other concerns  Weight stable    Patient Active Problem List    Diagnosis Date Noted     Status post vasectomy 02/15/2018     Priority: Medium     Idiopathic urticaria 06/29/2012     Priority: Medium     Ganglion of joint 12/02/2011     Priority: Medium     Ulcerative colitis (H) 04/01/2002     Priority: Medium     Past Medical History:   Diagnosis Date     Ulcerative colitis without complications (H)     mild. Last colonoscopy 2008. Dr Szymanski ([facility])      Past Surgical History:   Procedure Laterality Date     COLONOSCOPY      04/12/05,Dr. Farhan Szymanski, , [place].  Advised recheck in 3 years. Advised recheck in 3 years.     COLONOSCOPY      9/21/15,F/U 2020     OTHER SURGICAL HISTORY      1984,[id number],OTHER     OTHER SURGICAL HISTORY      1989,[id number],OTHER     OTHER SURGICAL HISTORY      10/24/2008,[id number],OTHER,Dr. Szymanski, [facility].       Review of Systems   Constitutional: Negative for activity change, appetite change, chills, diaphoresis, fatigue and fever.   Respiratory: Negative for choking and chest tightness.    Cardiovascular: Negative for chest pain.   Gastrointestinal: Negative for abdominal distention, abdominal pain, anal bleeding, constipation, diarrhea and hematochezia.   Musculoskeletal: Negative for back  "pain.   Neurological: Negative for dizziness.   Psychiatric/Behavioral: Negative for behavioral problems and mood changes.        OBJECTIVE:     /70 (BP Location: Right arm, Patient Position: Sitting, Cuff Size: Adult Large)   Pulse 78   Temp 97.1  F (36.2  C) (Tympanic)   Ht 1.803 m (5' 11\")   Wt 93 kg (205 lb)   BMI 28.59 kg/m    Body mass index is 28.59 kg/m .  Physical Exam   Constitutional: He appears well-developed.   Cardiovascular: Normal rate and regular rhythm.   Pulmonary/Chest: Effort normal and breath sounds normal. No respiratory distress.   Abdominal: Soft. Bowel sounds are normal. He exhibits no distension.   Skin:   Informed consent obtained, injectyed with 0.5 ml lidocaine  Verruca lesion on left temple, shave biopsy performed, bleeding controlled with liquid nitrogen           Diagnostic Test Results:  Results for orders placed or performed in visit on 02/05/19   Comprehensive metabolic panel   Result Value Ref Range    Sodium 139 134 - 144 mmol/L    Potassium 4.1 3.5 - 5.1 mmol/L    Chloride 104 98 - 107 mmol/L    Carbon Dioxide 27 21 - 31 mmol/L    Anion Gap 8 3 - 14 mmol/L    Glucose 93 70 - 105 mg/dL    Urea Nitrogen 16 7 - 25 mg/dL    Creatinine 0.97 0.70 - 1.30 mg/dL    GFR Estimate 83 >60 mL/min/[1.73_m2]    GFR Estimate If Black >90 >60 mL/min/[1.73_m2]    Calcium 9.6 8.6 - 10.3 mg/dL    Bilirubin Total 0.5 0.3 - 1.0 mg/dL    Albumin 4.5 3.5 - 5.7 g/dL    Protein Total 7.1 6.4 - 8.9 g/dL    Alkaline Phosphatase 62 34 - 104 U/L    ALT 23 7 - 52 U/L    AST 22 13 - 39 U/L   Sedimentation Rate (ESR)   Result Value Ref Range    Sed Rate 12 (H) 1 - 10 mm/h   CBC with platelets differential   Result Value Ref Range    WBC 4.6 4.0 - 11.0 10e9/L    RBC Count 4.34 (L) 4.4 - 5.9 10e12/L    Hemoglobin 14.4 13.3 - 17.7 g/dL    Hematocrit 41.7 40.0 - 53.0 %    MCV 96 78 - 100 fl    MCH 33.2 (H) 26.5 - 33.0 pg    MCHC 34.5 31.5 - 36.5 g/dL    RDW 12.6 10.0 - 15.0 %    Platelet Count 154 150 - " 450 10e9/L    Diff Method Automated Method     % Neutrophils 58.1 %    % Lymphocytes 29.8 %    % Monocytes 11.7 %    % Eosinophils 0.2 %    % Basophils 0.0 %    % Immature Granulocytes 0.2 %    Absolute Neutrophil 2.7 1.6 - 8.3 10e9/L    Absolute Lymphocytes 1.4 0.8 - 5.3 10e9/L    Absolute Monocytes 0.5 0.0 - 1.3 10e9/L    Absolute Eosinophils 0.0 0.0 - 0.7 10e9/L    Absolute Basophils 0.0 0.0 - 0.2 10e9/L    Abs Immature Granulocytes 0.0 0 - 0.4 10e9/L       ASSESSMENT/PLAN:           ICD-10-CM    1. Skin lesion L98.9 Surgical pathology exam     HC PUNCH BIOPSY OF SKIN, FIRST LESION   2. Ulcerative colitis with complication, unspecified location (H) K51.919 sulfaSALAzine (AZULFIDINE) 500 MG tablet     CBC with platelets differential     Sedimentation Rate (ESR)     Comprehensive metabolic panel     GASTROENTEROLOGY ADULT REF PROCEDURE ONLY     Comprehensive metabolic panel     Sedimentation Rate (ESR)     CBC with platelets differential     UC stable, reviewed last GI note, recommended q5 years screening scopes due to mild disease, will plan on 2020  Refilled medications  Labs are normal  Skin lesion biopsy today  Patient Instructions   Continue current medications  Need annual lab draw  Given immunosuppression, skin type (fair), you are at higher risk for skin cancer  May want to consider dermatology consult at some point  Will call with results of biopsy    Need to have colonoscopy 3-5 years, due in 9/2020      I spent over 25 minutes with the patient, greater than 50% was spent in counseling and coordination of care.    Grazyna Vieyra MD  [facility]   Verbal/written post procedure instructions were given to patient/caregiver./Instructed patient/caregiver to follow-up with primary care physician./Instructed patient/caregiver regarding signs and symptoms of infection./Keep the cast/splint/dressing clean and dry. Verbal/written post procedure instructions were given to patient/caregiver./Instructed patient/caregiver to follow-up with primary care physician./Instructed patient/caregiver regarding signs and symptoms of infection./Keep the cast/splint/dressing clean and dry. Verbal/written post procedure instructions were given to patient/caregiver./Instructed patient/caregiver to follow-up with primary care physician./Instructed patient/caregiver regarding signs and symptoms of infection./Keep the cast/splint/dressing clean and dry.

## (undated) DEVICE — SOL WATER 1500ML

## (undated) DEVICE — SUCTION MANIFOLD NEPTUNE 2 SYS 4 PORT 0702-020-000

## (undated) DEVICE — ENDO KIT COMPLIANCE DYKENDOCMPLY

## (undated) DEVICE — ESU ENDO FORCEP BX HOT FD-210U

## (undated) DEVICE — TUBING SUCTION 10'X3/16" N510

## (undated) DEVICE — ENDO BRUSH CHANNEL MASTER CLEANING 2-4.2MM BW-412T

## (undated) RX ORDER — PROPOFOL 10 MG/ML
INJECTION, EMULSION INTRAVENOUS
Status: DISPENSED
Start: 2020-01-15

## (undated) RX ORDER — SULFAMETHOXAZOLE/TRIMETHOPRIM 800-160 MG
TABLET ORAL
Status: DISPENSED
Start: 2019-03-21

## (undated) RX ORDER — LIDOCAINE HYDROCHLORIDE 20 MG/ML
INJECTION, SOLUTION EPIDURAL; INFILTRATION; INTRACAUDAL; PERINEURAL
Status: DISPENSED
Start: 2020-01-15